# Patient Record
Sex: FEMALE | URBAN - METROPOLITAN AREA
[De-identification: names, ages, dates, MRNs, and addresses within clinical notes are randomized per-mention and may not be internally consistent; named-entity substitution may affect disease eponyms.]

---

## 2022-10-19 ENCOUNTER — APPOINTMENT (RX ONLY)
Dept: URBAN - METROPOLITAN AREA CLINIC 60 | Facility: CLINIC | Age: 62
Setting detail: DERMATOLOGY
End: 2022-10-19

## 2022-10-19 DIAGNOSIS — L72.0 EPIDERMAL CYST: ICD-10-CM

## 2022-10-19 DIAGNOSIS — D18.0 HEMANGIOMA: ICD-10-CM

## 2022-10-19 DIAGNOSIS — D22 MELANOCYTIC NEVI: ICD-10-CM

## 2022-10-19 DIAGNOSIS — L81.4 OTHER MELANIN HYPERPIGMENTATION: ICD-10-CM

## 2022-10-19 DIAGNOSIS — L71.8 OTHER ROSACEA: ICD-10-CM | Status: INADEQUATELY CONTROLLED

## 2022-10-19 DIAGNOSIS — L82.1 OTHER SEBORRHEIC KERATOSIS: ICD-10-CM

## 2022-10-19 PROBLEM — D22.5 MELANOCYTIC NEVI OF TRUNK: Status: ACTIVE | Noted: 2022-10-19

## 2022-10-19 PROBLEM — D18.01 HEMANGIOMA OF SKIN AND SUBCUTANEOUS TISSUE: Status: ACTIVE | Noted: 2022-10-19

## 2022-10-19 PROCEDURE — 99203 OFFICE O/P NEW LOW 30 MIN: CPT

## 2022-10-19 PROCEDURE — ? ADDITIONAL NOTES

## 2022-10-19 PROCEDURE — ? TREATMENT REGIMEN

## 2022-10-19 PROCEDURE — ? FULL BODY SKIN EXAM

## 2022-10-19 PROCEDURE — ? COSMETIC EXTRACTIONS

## 2022-10-19 PROCEDURE — ? COUNSELING

## 2022-10-19 PROCEDURE — ? RECOMMENDATIONS

## 2022-10-19 ASSESSMENT — LOCATION DETAILED DESCRIPTION DERM
LOCATION DETAILED: LEFT MEDIAL MALAR CHEEK
LOCATION DETAILED: EPIGASTRIC SKIN
LOCATION DETAILED: RIGHT LATERAL SUPERIOR CHEST
LOCATION DETAILED: MIDDLE STERNUM
LOCATION DETAILED: RIGHT CENTRAL MALAR CHEEK
LOCATION DETAILED: LEFT SUPERIOR MEDIAL UPPER BACK
LOCATION DETAILED: LEFT INFRAMAMMARY CREASE (INNER QUADRANT)

## 2022-10-19 ASSESSMENT — LOCATION SIMPLE DESCRIPTION DERM
LOCATION SIMPLE: LEFT BREAST
LOCATION SIMPLE: ABDOMEN
LOCATION SIMPLE: RIGHT CHEEK
LOCATION SIMPLE: LEFT UPPER BACK
LOCATION SIMPLE: CHEST
LOCATION SIMPLE: LEFT CHEEK

## 2022-10-19 ASSESSMENT — LOCATION ZONE DERM
LOCATION ZONE: TRUNK
LOCATION ZONE: FACE

## 2022-10-19 NOTE — PROCEDURE: COSMETIC EXTRACTIONS
Price (Use Numbers Only, No Special Characters Or $): 20
Render The Number Of Extractions: Yes
Anesthesia Volume In Cc: 0
Detail Level: Detailed
Consent: The patient's consent was obtained including but not limited to risks of crusting, scabbing, blistering, scarring, darker or lighter pigmentary change, recurrence, incomplete removal and infection.
Post-Care Instructions: I reviewed with the patient in detail post-care instructions. Patient is to wear sunprotection, and avoid picking at any of the treated lesions. Pt may apply Vaseline to crusted or scabbing areas.